# Patient Record
Sex: FEMALE | Race: BLACK OR AFRICAN AMERICAN | NOT HISPANIC OR LATINO | Employment: UNEMPLOYED | URBAN - NONMETROPOLITAN AREA
[De-identification: names, ages, dates, MRNs, and addresses within clinical notes are randomized per-mention and may not be internally consistent; named-entity substitution may affect disease eponyms.]

---

## 2019-03-15 LAB — PAP RECOMMENDATION EXT: NORMAL

## 2021-07-01 DIAGNOSIS — E66.2 OBESITY HYPOVENTILATION SYNDROME: Primary | ICD-10-CM

## 2021-07-01 DIAGNOSIS — R09.02 HYPOXEMIA: Primary | ICD-10-CM

## 2021-10-19 DIAGNOSIS — R09.02 HYPOXIA: Primary | ICD-10-CM

## 2022-01-31 DIAGNOSIS — R09.02 HYPOXIA: Primary | ICD-10-CM

## 2022-02-03 ENCOUNTER — CLINICAL SUPPORT (OUTPATIENT)
Dept: RESPIRATORY THERAPY | Facility: HOSPITAL | Age: 47
End: 2022-02-03
Payer: MEDICAID

## 2022-02-03 DIAGNOSIS — R06.02 SHORTNESS OF BREATH: Primary | ICD-10-CM

## 2022-02-03 LAB
HCO3 UR-SCNC: 28.5 MMOL/L (ref 21–28)
PCO2 BLDA: 45 MMHG (ref 35–48)
PH SMN: 7.41 [PH] (ref 7.35–7.45)
PO2 BLDA: 82 MMHG (ref 83–108)
POC BASE EXCESS: 3.3 MMOL/L (ref -2–3)
POC SATURATED O2: 96 %

## 2022-02-03 PROCEDURE — 94010 BREATHING CAPACITY TEST: CPT

## 2022-02-03 PROCEDURE — 82803 BLOOD GASES ANY COMBINATION: CPT

## 2022-02-03 PROCEDURE — 36600 WITHDRAWAL OF ARTERIAL BLOOD: CPT

## 2022-02-15 NOTE — PROCEDURES
Spirometry   Forced vital capacity 2.66 L 74% predicted  FEV1 2.23 L 75% predicted  FVC ratio 84%  Small airways normal assessment normal spirometry

## 2022-02-16 ENCOUNTER — OFFICE VISIT (OUTPATIENT)
Dept: SLEEP MEDICINE | Facility: CLINIC | Age: 47
End: 2022-02-16
Attending: FAMILY MEDICINE
Payer: MEDICAID

## 2022-02-16 VITALS — BODY MASS INDEX: 45.99 KG/M2 | HEIGHT: 67 IN | OXYGEN SATURATION: 99 % | WEIGHT: 293 LBS

## 2022-02-16 DIAGNOSIS — E66.2 MORBID (SEVERE) OBESITY WITH ALVEOLAR HYPOVENTILATION: ICD-10-CM

## 2022-02-16 DIAGNOSIS — G47.33 OSA ON CPAP: Primary | ICD-10-CM

## 2022-02-16 PROBLEM — G89.29 CHRONIC PAIN: Status: ACTIVE | Noted: 2022-02-16

## 2022-02-16 PROBLEM — I10 HYPERTENSION: Status: ACTIVE | Noted: 2022-02-16

## 2022-02-16 PROCEDURE — 99213 PR OFFICE/OUTPT VISIT, EST, LEVL III, 20-29 MIN: ICD-10-PCS | Mod: S$PBB,,, | Performed by: FAMILY MEDICINE

## 2022-02-16 PROCEDURE — 1159F PR MEDICATION LIST DOCUMENTED IN MEDICAL RECORD: ICD-10-PCS | Mod: CPTII,,, | Performed by: FAMILY MEDICINE

## 2022-02-16 PROCEDURE — 1160F RVW MEDS BY RX/DR IN RCRD: CPT | Mod: CPTII,,, | Performed by: FAMILY MEDICINE

## 2022-02-16 PROCEDURE — 4010F PR ACE/ARB THEARPY RXD/TAKEN: ICD-10-PCS | Mod: CPTII,,, | Performed by: FAMILY MEDICINE

## 2022-02-16 PROCEDURE — 1160F PR REVIEW ALL MEDS BY PRESCRIBER/CLIN PHARMACIST DOCUMENTED: ICD-10-PCS | Mod: CPTII,,, | Performed by: FAMILY MEDICINE

## 2022-02-16 PROCEDURE — 99213 OFFICE O/P EST LOW 20 MIN: CPT | Mod: PBBFAC,PN | Performed by: FAMILY MEDICINE

## 2022-02-16 PROCEDURE — 3008F BODY MASS INDEX DOCD: CPT | Mod: CPTII,,, | Performed by: FAMILY MEDICINE

## 2022-02-16 PROCEDURE — 4010F ACE/ARB THERAPY RXD/TAKEN: CPT | Mod: CPTII,,, | Performed by: FAMILY MEDICINE

## 2022-02-16 PROCEDURE — 3008F PR BODY MASS INDEX (BMI) DOCUMENTED: ICD-10-PCS | Mod: CPTII,,, | Performed by: FAMILY MEDICINE

## 2022-02-16 PROCEDURE — 1159F MED LIST DOCD IN RCRD: CPT | Mod: CPTII,,, | Performed by: FAMILY MEDICINE

## 2022-02-16 PROCEDURE — 99213 OFFICE O/P EST LOW 20 MIN: CPT | Mod: S$PBB,,, | Performed by: FAMILY MEDICINE

## 2022-02-16 RX ORDER — OLMESARTAN MEDOXOMIL 20 MG/1
20 TABLET ORAL DAILY
COMMUNITY

## 2022-02-16 RX ORDER — DULOXETIN HYDROCHLORIDE 60 MG/1
60 CAPSULE, DELAYED RELEASE ORAL 2 TIMES DAILY
COMMUNITY

## 2022-02-16 RX ORDER — GABAPENTIN 600 MG/1
600 TABLET ORAL 3 TIMES DAILY
COMMUNITY

## 2022-02-16 RX ORDER — AMLODIPINE BESYLATE 10 MG/1
10 TABLET ORAL DAILY
COMMUNITY

## 2022-02-16 RX ORDER — ESOMEPRAZOLE MAGNESIUM 40 MG/1
40 CAPSULE, DELAYED RELEASE ORAL
COMMUNITY

## 2022-02-16 RX ORDER — ASPIRIN 81 MG/1
81 TABLET ORAL DAILY
COMMUNITY

## 2022-02-16 NOTE — PROCEDURES
ProceduresPatient presents for results of FVC and ABG.  Patient is tired all the time.  Patient is having bed wetting.  Patient's machine is on recall.  ESS is 17.  Patient gets supplies from [facility] and wears a nasal mask and has a non heated tubing.  Unable to download data card, patient did not bring cord.

## 2022-02-16 NOTE — PROGRESS NOTES
Subjective:       Patient ID: Thomas Hawkins is a 46 y.o. female.    Chief Complaint: Sleep Apnea and Obesity (CPAP management)    Patient follows up for CPAP management and to review her recent pulmonary function test and ABGs.  Patient did not bring her power cord for her CPAP and we were unable to download any data from her card.  Pulmonary function test is reviewed and appears normal and her ABGs reveals a pCO2 of 45.  Patient's last overnight oximetry on CPAP with 2 L of O2 revealed acceptable O2 saturation through the night.  Patient's [place] score is 17 but we are not able to determine her average AHI or compliance at this time.  The patient states she does use her CPAP and oxygen every night.  Patient's initial AHI was 15.9 with a low O2 saturation of 69%.  Patient will bring her CPAP along with her power cord to the sleep clinic next Wednesday in hopes that we will be able to download her card.  The patient is not aware of the [employer] recall and the patient is given a recall sheet.  I reviewed the recall and symptoms that she would need to monitor while using her CPAP.  I have instructed the patient to not stop using her CPAP as her benefit far outweighs the risk.    Review of Systems   Constitutional: Positive for fatigue.        Negative EDS   Respiratory: Negative for apnea.    Psychiatric/Behavioral: Negative for sleep disturbance.         Objective:      Physical Exam  Constitutional:       Appearance: She is obese.   Cardiovascular:      Rate and Rhythm: Normal rate and regular rhythm.      Heart sounds: No murmur heard.      Pulmonary:      Breath sounds: Normal breath sounds.   Skin:     General: Skin is warm and dry.   Neurological:      Mental Status: She is alert and oriented to person, place, and time.         Assessment:       Problem List Items Addressed This Visit        Other    EDER on CPAP - Primary    Morbid (severe) obesity with alveolar hypoventilation          Plan:        1. Continue CPAP @ 13 cm H20 with 2 L of O2.  2. Weight loss management  3. Caution while operating a motor vehicle  4. Prescription for new supplies  5. Follow up sleep clinic in 3 months.

## 2023-03-30 ENCOUNTER — IMMUNIZATION (OUTPATIENT)
Dept: FAMILY MEDICINE | Facility: CLINIC | Age: 48
End: 2023-03-30
Payer: MEDICAID

## 2023-03-30 VITALS
OXYGEN SATURATION: 99 % | HEART RATE: 71 BPM | RESPIRATION RATE: 20 BRPM | SYSTOLIC BLOOD PRESSURE: 131 MMHG | WEIGHT: 293 LBS | BODY MASS INDEX: 45.99 KG/M2 | TEMPERATURE: 99 F | DIASTOLIC BLOOD PRESSURE: 58 MMHG | HEIGHT: 67 IN

## 2023-03-30 DIAGNOSIS — Z23 NEED FOR VACCINATION: Primary | ICD-10-CM

## 2023-03-30 DIAGNOSIS — L02.811 SCALP ABSCESS: Primary | ICD-10-CM

## 2023-03-30 PROCEDURE — 99213 OFFICE O/P EST LOW 20 MIN: CPT | Mod: 25,,, | Performed by: NURSE PRACTITIONER

## 2023-03-30 PROCEDURE — 91313 COVID-19, MRNA, LNP-S, BIVALENT BOOSTER, PF, 50 MCG/0.5 ML: CPT | Mod: ,,, | Performed by: NURSE PRACTITIONER

## 2023-03-30 PROCEDURE — 96372 THER/PROPH/DIAG INJ SC/IM: CPT | Mod: ,,, | Performed by: NURSE PRACTITIONER

## 2023-03-30 PROCEDURE — 0134A COVID-19, MRNA, LNP-S, BIVALENT BOOSTER, PF, 50 MCG/0.5 ML: CPT | Mod: ,,, | Performed by: NURSE PRACTITIONER

## 2023-03-30 PROCEDURE — 91313 COVID-19, MRNA, LNP-S, BIVALENT BOOSTER, PF, 50 MCG/0.5 ML: ICD-10-PCS | Mod: ,,, | Performed by: NURSE PRACTITIONER

## 2023-03-30 PROCEDURE — 99213 PR OFFICE/OUTPT VISIT, EST, LEVL III, 20-29 MIN: ICD-10-PCS | Mod: 25,,, | Performed by: NURSE PRACTITIONER

## 2023-03-30 PROCEDURE — 0134A COVID-19, MRNA, LNP-S, BIVALENT BOOSTER, PF, 50 MCG/0.5 ML: ICD-10-PCS | Mod: ,,, | Performed by: NURSE PRACTITIONER

## 2023-03-30 PROCEDURE — 96372 PR INJECTION,THERAP/PROPH/DIAG2ST, IM OR SUBCUT: ICD-10-PCS | Mod: ,,, | Performed by: NURSE PRACTITIONER

## 2023-03-30 RX ORDER — LINACLOTIDE 145 UG/1
CAPSULE, GELATIN COATED ORAL
COMMUNITY
Start: 2022-10-14

## 2023-03-30 RX ORDER — CEPHALEXIN 500 MG/1
500 CAPSULE ORAL EVERY 6 HOURS
Qty: 30 CAPSULE | Refills: 0 | Status: SHIPPED | OUTPATIENT
Start: 2023-03-30

## 2023-03-30 RX ORDER — GABAPENTIN 800 MG/1
800 TABLET ORAL 3 TIMES DAILY
COMMUNITY
Start: 2023-02-06

## 2023-03-30 RX ORDER — TRAZODONE HYDROCHLORIDE 100 MG/1
TABLET ORAL
COMMUNITY
Start: 2022-10-14

## 2023-03-30 RX ORDER — CEFTRIAXONE 1 G/1
1 INJECTION, POWDER, FOR SOLUTION INTRAMUSCULAR; INTRAVENOUS
Status: COMPLETED | OUTPATIENT
Start: 2023-03-30 | End: 2023-03-30

## 2023-03-30 RX ORDER — OLANZAPINE 5 MG/1
2.5 TABLET ORAL NIGHTLY PRN
COMMUNITY
Start: 2023-03-18

## 2023-03-30 RX ORDER — VORTIOXETINE 5 MG/1
1 TABLET, FILM COATED ORAL
COMMUNITY
Start: 2023-01-26

## 2023-03-30 RX ORDER — METOPROLOL SUCCINATE 25 MG/1
25 TABLET, EXTENDED RELEASE ORAL
COMMUNITY
Start: 2023-02-18

## 2023-03-30 RX ORDER — METHOCARBAMOL 500 MG/1
500 TABLET, FILM COATED ORAL EVERY 8 HOURS PRN
COMMUNITY
Start: 2023-02-07

## 2023-03-30 RX ADMIN — CEFTRIAXONE 1 G: 1 INJECTION, POWDER, FOR SOLUTION INTRAMUSCULAR; INTRAVENOUS at 04:03

## 2023-03-30 NOTE — PROGRESS NOTES
CHUCHO Vargas   [facility]  [address]   [place] [address]     PATIENT NAME: Thomas Hawkins  : 1975  DATE: 3/30/23  MRN: [id number]      Billing Provider: CHUCHO Vargas  Level of Service:   Patient PCP Information       Provider PCP Type    Primary Doctor No General            Reason for Visit / Chief Complaint: sore (Sore on scalp x 4 days) and COVID booster         History of Present Illness / Problem Focused Workflow     Thomas Hawkins is a 47 y.o. female presents to the clinic as a new patient  with  multiple health problems and follows with Stacey Iniguez NP in [place] and follows with Jackie in [place] for depression,.  She came here today due to a sore in her scalp 4 days ago and is very sore. She had a sore on her   right  index finger first and opened it and it cleared up but has been picking at her scalp.    Pt is currently living with her sister, Gilma Hawkins,  and they  are looking after each other.      Review of Systems     Review of Systems   Constitutional:  Negative for fatigue.   HENT:  Negative for nasal congestion and sore throat.    Respiratory:  Negative for cough, chest tightness and shortness of breath.    Cardiovascular:  Negative for chest pain, palpitations and leg swelling.   Gastrointestinal:  Negative for nausea, vomiting and reflux.   Musculoskeletal:  Positive for arthralgias and back pain.   Integumentary:         Right parietal scalp abscess approx 1.5cm that is firm with some crusting noted   Neurological:  Negative for weakness and memory loss.   Psychiatric/Behavioral:  Negative for confusion and sleep disturbance.       Medical / Social / Family History     Past Medical History:   Diagnosis Date    Chronic pain     Hypertension     EDER (obstructive sleep apnea)        Past Surgical History:   Procedure Laterality Date     SECTION      cessarian      cholystectomy      GALLBLADDER SURGERY         Social History  Ms.  reports that she has  never smoked. She has been exposed to tobacco smoke. She has never used smokeless tobacco. She reports current alcohol use of about 1.0 standard drink per week. She reports that she does not use drugs.    Family History  Ms.'s family history is not on file.    Medications and Allergies     Medications  Outpatient Medications Marked as Taking for the 3/30/23 encounter (Immunization) with CHUCHO Vargas   Medication Sig Dispense Refill    amLODIPine (NORVASC) 10 MG tablet Take 10 mg by mouth once daily.      aspirin (ECOTRIN) 81 MG EC tablet Take 81 mg by mouth once daily.      DULoxetine (CYMBALTA) 60 MG capsule Take 60 mg by mouth 2 (two) times daily.      esomeprazole (NEXIUM) 40 MG capsule Take 40 mg by mouth before breakfast.      gabapentin (NEURONTIN) 600 MG tablet Take 600 mg by mouth 3 (three) times daily.      gabapentin (NEURONTIN) 800 MG tablet Take 800 mg by mouth 3 (three) times daily.      LINZESS 145 mcg Cap capsule Take by mouth.      medical supply, miscellaneous (O-2 SUSPENSORY MISC) 2 L/min by Misc.(Non-Drug; Combo Route) route. Patient wears o2 at home all the time on 2lpm.      methocarbamoL (ROBAXIN) 500 MG Tab Take 500 mg by mouth every 8 (eight) hours as needed.      metoprolol succinate (TOPROL-XL) 25 MG 24 hr tablet Take 25 mg by mouth.      OLANZapine (ZYPREXA) 5 MG tablet Take 2.5 mg by mouth nightly as needed.      traZODone (DESYREL) 100 MG tablet Take by mouth.      TRINTELLIX 5 mg Tab Take 1 tablet by mouth.       Current Facility-Administered Medications for the 3/30/23 encounter (Immunization) with CHUCHO Vargas   Medication Dose Route Frequency Provider Last Rate Last Admin    [COMPLETED] cefTRIAXone injection 1 g  1 g Intramuscular 1 time in Clinic/HOD CHUCHO Vargas   1 g at 03/30/23 6338       Allergies  Review of patient's allergies indicates:   Allergen Reactions    Clindamycin Shortness Of Breath and Swelling    Penicillins Itching    Sulfamethoxazole-trimethoprim Itching  "   Abilify [aripiprazole]        Physical Examination     Vitals:    03/30/23 1537   BP: (!) 131/58   Pulse: 71   Resp: 20   Temp: 98.5 °F (36.9 °C)   TempSrc: Oral   SpO2: 99%   Weight: (!) 199.3 kg (439 lb 6.4 oz)   Height: 5' 7" (1.702 m)      Physical Exam  Cardiovascular:      Rate and Rhythm: Normal rate and regular rhythm.      Pulses: Normal pulses.      Heart sounds: Normal heart sounds.   Pulmonary:      Effort: Pulmonary effort is normal.      Breath sounds: Normal breath sounds.   Musculoskeletal:      Comments: Right parietal scalp abscess approx 1.5cm that is firm with some crusting noted   Skin:     General: Skin is warm.        Assessment and Plan (including Health Maintenance)     :    Plan:  advised to apply warm moist compresses to area on scalp every 2 hours,  Rocephin GM1 IM now, keflex 500mg tid #30 Rx.   Take benadryl if any rash or itching develops.  Follow up if not improving in 3 days.         Health Maintenance Due   Topic Date Due    Hepatitis C Screening  Never done    Cervical Cancer Screening  Never done    Lipid Panel  Never done    Pneumococcal Vaccines (Age 0-64) (1 - PCV) Never done    HIV Screening  Never done    Mammogram  Never done    TETANUS VACCINE  06/13/2007    Hemoglobin A1c (Diabetic Prevention Screening)  Never done    Colorectal Cancer Screening  Never done    Influenza Vaccine (1) 09/01/2022       Problem List Items Addressed This Visit    None  Visit Diagnoses       Scalp abscess    -  Primary        .  Scalp abscess  -     cephALEXin (KEFLEX) 500 MG capsule; Take 1 capsule (500 mg total) by mouth every 6 (six) hours.  Dispense: 30 capsule; Refill: 0    Other orders  -     cefTRIAXone injection 1 g       The patient has no Health Maintenance topics of status Not Due    Procedures     Future Appointments   Date Time Provider Department Center   4/21/2023  1:45 PM Donovan Mendoza, DO [facility]        No follow-ups on file.     Signature:  Eli Luna, " FNP    Date of encounter: 3/30/23

## 2023-04-18 ENCOUNTER — PATIENT OUTREACH (OUTPATIENT)
Dept: ADMINISTRATIVE | Facility: HOSPITAL | Age: 48
End: 2023-04-18

## 2023-06-14 ENCOUNTER — OFFICE VISIT (OUTPATIENT)
Dept: SLEEP MEDICINE | Facility: CLINIC | Age: 48
End: 2023-06-14
Payer: MEDICAID

## 2023-06-14 VITALS
OXYGEN SATURATION: 99 % | SYSTOLIC BLOOD PRESSURE: 139 MMHG | WEIGHT: 293 LBS | HEART RATE: 68 BPM | HEIGHT: 67 IN | DIASTOLIC BLOOD PRESSURE: 87 MMHG | BODY MASS INDEX: 45.99 KG/M2

## 2023-06-14 DIAGNOSIS — G47.33 OSA ON CPAP: Primary | ICD-10-CM

## 2023-06-14 DIAGNOSIS — E66.2 MORBID (SEVERE) OBESITY WITH ALVEOLAR HYPOVENTILATION: ICD-10-CM

## 2023-06-14 DIAGNOSIS — I10 ESSENTIAL HYPERTENSION: ICD-10-CM

## 2023-06-14 PROCEDURE — 99213 PR OFFICE/OUTPT VISIT, EST, LEVL III, 20-29 MIN: ICD-10-PCS | Mod: S$PBB,,, | Performed by: FAMILY MEDICINE

## 2023-06-14 PROCEDURE — 3008F PR BODY MASS INDEX (BMI) DOCUMENTED: ICD-10-PCS | Mod: CPTII,,, | Performed by: FAMILY MEDICINE

## 2023-06-14 PROCEDURE — 3079F PR MOST RECENT DIASTOLIC BLOOD PRESSURE 80-89 MM HG: ICD-10-PCS | Mod: CPTII,,, | Performed by: FAMILY MEDICINE

## 2023-06-14 PROCEDURE — 1159F MED LIST DOCD IN RCRD: CPT | Mod: CPTII,,, | Performed by: FAMILY MEDICINE

## 2023-06-14 PROCEDURE — 3008F BODY MASS INDEX DOCD: CPT | Mod: CPTII,,, | Performed by: FAMILY MEDICINE

## 2023-06-14 PROCEDURE — 3079F DIAST BP 80-89 MM HG: CPT | Mod: CPTII,,, | Performed by: FAMILY MEDICINE

## 2023-06-14 PROCEDURE — 3075F PR MOST RECENT SYSTOLIC BLOOD PRESS GE 130-139MM HG: ICD-10-PCS | Mod: CPTII,,, | Performed by: FAMILY MEDICINE

## 2023-06-14 PROCEDURE — 99213 OFFICE O/P EST LOW 20 MIN: CPT | Mod: S$PBB,,, | Performed by: FAMILY MEDICINE

## 2023-06-14 PROCEDURE — 1159F PR MEDICATION LIST DOCUMENTED IN MEDICAL RECORD: ICD-10-PCS | Mod: CPTII,,, | Performed by: FAMILY MEDICINE

## 2023-06-14 PROCEDURE — 99214 OFFICE O/P EST MOD 30 MIN: CPT | Mod: PBBFAC,PN | Performed by: FAMILY MEDICINE

## 2023-06-14 PROCEDURE — 3075F SYST BP GE 130 - 139MM HG: CPT | Mod: CPTII,,, | Performed by: FAMILY MEDICINE

## 2023-06-14 NOTE — PROGRESS NOTES
Patient presents to clinic for CPAP F/U. ESS is 20. Patient gets supplies from [facility] in [place]. Patient wears a nasal mask with non-heated tubing. Patient has received her new machine from the recall. Patient states that the water chamber door doesn't want to close at times. Patient is supposed to wear o2 at night, but cannot use the concentrator due to her home situation.

## 2023-06-14 NOTE — PROGRESS NOTES
Subjective     Patient ID: Thomas Hawkins is a 47 y.o. female.    Chief Complaint: No chief complaint on file.    Patient follows up in sleep clinic for CPAP management having no problems with her CPAP or mask but is not able to use her oxygen because of electrical issues in her home.  The patient is 100% on her compliance with an average AHI of 1.4 at a pressure of 13 cm H2O.  The patient has obese hypoventilation and requires 2 L of O2 with her CPAP.  I discussed her diagnosis and her nocturnal hypoxia issues and the risk of not treating her hypoxia.  Patient's initial AHI was 15.9 with a low O2 saturation of 69%.  The patient is using and benefitting from CPAP.  The patient states that she will get this electrical issue corrected and start using her O2 with her CPAP.    Review of Systems   Constitutional:  Positive for fatigue.        Negative EDS   Respiratory:  Negative for apnea.    Psychiatric/Behavioral:  Negative for sleep disturbance.         Objective     Physical Exam  Constitutional:       Appearance: She is morbidly obese.   Cardiovascular:      Rate and Rhythm: Normal rate and regular rhythm.      Heart sounds: No murmur heard.  Pulmonary:      Breath sounds: Normal breath sounds.   Skin:     General: Skin is warm and dry.   Neurological:      Mental Status: She is alert and oriented to person, place, and time.          Assessment and Plan     1. EDER on CPAP    2. Morbid (severe) obesity with alveolar hypoventilation    3. Essential hypertension        Continue CPAP @ 13 cm H20 with 2 L of O2.  Caution while operating a motor vehicle  Weight loss management  Prescription for new supplies  Follow up sleep clinic in 1 year.           Follow up in about 1 year (around 6/14/2024).

## 2024-01-05 ENCOUNTER — TELEPHONE (OUTPATIENT)
Dept: SLEEP MEDICINE | Facility: CLINIC | Age: 49
End: 2024-01-05
Payer: MEDICAID

## 2024-03-11 ENCOUNTER — OFFICE VISIT (OUTPATIENT)
Dept: OBSTETRICS AND GYNECOLOGY | Facility: CLINIC | Age: 49
End: 2024-03-11
Payer: MEDICAID

## 2024-03-11 VITALS — TEMPERATURE: 98 F | HEIGHT: 67 IN | WEIGHT: 293 LBS | BODY MASS INDEX: 45.99 KG/M2

## 2024-03-11 DIAGNOSIS — R82.81 PYURIA: ICD-10-CM

## 2024-03-11 DIAGNOSIS — D25.9 UTERINE LEIOMYOMA, UNSPECIFIED LOCATION: ICD-10-CM

## 2024-03-11 DIAGNOSIS — N28.9 IMPAIRED RENAL FUNCTION: ICD-10-CM

## 2024-03-11 DIAGNOSIS — N32.81 OAB (OVERACTIVE BLADDER): Primary | ICD-10-CM

## 2024-03-11 DIAGNOSIS — R73.03 PREDIABETES: ICD-10-CM

## 2024-03-11 DIAGNOSIS — R35.1 NOCTURIA MORE THAN TWICE PER NIGHT: ICD-10-CM

## 2024-03-11 DIAGNOSIS — N76.0 BACTERIAL VAGINOSIS: ICD-10-CM

## 2024-03-11 DIAGNOSIS — R93.89 THICKENED ENDOMETRIUM: ICD-10-CM

## 2024-03-11 DIAGNOSIS — E66.2 MORBID (SEVERE) OBESITY WITH ALVEOLAR HYPOVENTILATION: ICD-10-CM

## 2024-03-11 DIAGNOSIS — B96.89 BACTERIAL VAGINOSIS: ICD-10-CM

## 2024-03-11 DIAGNOSIS — G47.33 OSA ON CPAP: ICD-10-CM

## 2024-03-11 DIAGNOSIS — E55.9 VITAMIN D DEFICIENCY: ICD-10-CM

## 2024-03-11 DIAGNOSIS — N89.8 VAGINAL DISCHARGE: ICD-10-CM

## 2024-03-11 DIAGNOSIS — G89.4 CHRONIC PAIN SYNDROME: ICD-10-CM

## 2024-03-11 DIAGNOSIS — A59.01 VAGINITIS DUE TO TRICHOMONAS: ICD-10-CM

## 2024-03-11 DIAGNOSIS — B97.7 HPV IN FEMALE: ICD-10-CM

## 2024-03-11 DIAGNOSIS — M54.50 CHRONIC LOW BACK PAIN WITHOUT SCIATICA, UNSPECIFIED BACK PAIN LATERALITY: ICD-10-CM

## 2024-03-11 DIAGNOSIS — N76.0 VAGINITIS AND VULVOVAGINITIS: ICD-10-CM

## 2024-03-11 DIAGNOSIS — I10 ESSENTIAL HYPERTENSION: ICD-10-CM

## 2024-03-11 DIAGNOSIS — G89.29 CHRONIC LOW BACK PAIN WITHOUT SCIATICA, UNSPECIFIED BACK PAIN LATERALITY: ICD-10-CM

## 2024-03-11 DIAGNOSIS — N85.2 BULKY OR ENLARGED UTERUS: ICD-10-CM

## 2024-03-11 LAB
BACTERIA #/AREA URNS HPF: ABNORMAL /HPF
BILIRUB UR QL STRIP: NEGATIVE
CANDIDA SPECIES: NEGATIVE
CLARITY UR: ABNORMAL
COLOR UR: YELLOW
CTP QC/QA: YES
FECAL OCCULT BLOOD, POC: NEGATIVE
GARDNERELLA: POSITIVE
GLUCOSE UR STRIP-MCNC: NORMAL MG/DL
KETONES UR STRIP-SCNC: NEGATIVE MG/DL
LEUKOCYTE ESTERASE UR QL STRIP: ABNORMAL
MUCOUS, UA: ABNORMAL /LPF
NITRITE UR QL STRIP: NEGATIVE
PH UR STRIP: 5.5 PH UNITS
PROT UR QL STRIP: 30
RBC # UR STRIP: ABNORMAL /UL
RBC #/AREA URNS HPF: 7 /HPF
SP GR UR STRIP: 1.03
SQUAMOUS #/AREA URNS LPF: ABNORMAL /HPF
TRICHOMONAS: POSITIVE
UROBILINOGEN UR STRIP-ACNC: 2 MG/DL
WBC #/AREA URNS HPF: 30 /HPF

## 2024-03-11 PROCEDURE — 81001 URINALYSIS AUTO W/SCOPE: CPT | Mod: ,,, | Performed by: CLINICAL MEDICAL LABORATORY

## 2024-03-11 PROCEDURE — 3008F BODY MASS INDEX DOCD: CPT | Mod: CPTII,,, | Performed by: OBSTETRICS & GYNECOLOGY

## 2024-03-11 PROCEDURE — 99459 PELVIC EXAMINATION: CPT | Mod: PBBFAC | Performed by: OBSTETRICS & GYNECOLOGY

## 2024-03-11 PROCEDURE — 87510 GARDNER VAG DNA DIR PROBE: CPT | Mod: ,,, | Performed by: CLINICAL MEDICAL LABORATORY

## 2024-03-11 PROCEDURE — 82270 OCCULT BLOOD FECES: CPT | Mod: PBBFAC | Performed by: OBSTETRICS & GYNECOLOGY

## 2024-03-11 PROCEDURE — 1160F RVW MEDS BY RX/DR IN RCRD: CPT | Mod: CPTII,,, | Performed by: OBSTETRICS & GYNECOLOGY

## 2024-03-11 PROCEDURE — 87660 TRICHOMONAS VAGIN DIR PROBE: CPT | Mod: ,,, | Performed by: CLINICAL MEDICAL LABORATORY

## 2024-03-11 PROCEDURE — 99205 OFFICE O/P NEW HI 60 MIN: CPT | Mod: S$PBB,,, | Performed by: OBSTETRICS & GYNECOLOGY

## 2024-03-11 PROCEDURE — 87086 URINE CULTURE/COLONY COUNT: CPT | Mod: ,,, | Performed by: CLINICAL MEDICAL LABORATORY

## 2024-03-11 PROCEDURE — 87624 HPV HI-RISK TYP POOLED RSLT: CPT | Mod: ,,, | Performed by: CLINICAL MEDICAL LABORATORY

## 2024-03-11 PROCEDURE — 99999PBSHW POCT OCCULT BLOOD STOOL: Mod: PBBFAC,,,

## 2024-03-11 PROCEDURE — 87480 CANDIDA DNA DIR PROBE: CPT | Mod: ,,, | Performed by: CLINICAL MEDICAL LABORATORY

## 2024-03-11 PROCEDURE — 99214 OFFICE O/P EST MOD 30 MIN: CPT | Mod: PBBFAC | Performed by: OBSTETRICS & GYNECOLOGY

## 2024-03-11 PROCEDURE — 87591 N.GONORRHOEAE DNA AMP PROB: CPT | Mod: ,,, | Performed by: CLINICAL MEDICAL LABORATORY

## 2024-03-11 PROCEDURE — 99459 PELVIC EXAMINATION: CPT | Mod: S$PBB,,, | Performed by: OBSTETRICS & GYNECOLOGY

## 2024-03-11 PROCEDURE — 87491 CHLMYD TRACH DNA AMP PROBE: CPT | Mod: ,,, | Performed by: CLINICAL MEDICAL LABORATORY

## 2024-03-11 PROCEDURE — 1159F MED LIST DOCD IN RCRD: CPT | Mod: CPTII,,, | Performed by: OBSTETRICS & GYNECOLOGY

## 2024-03-11 PROCEDURE — 88142 CYTOPATH C/V THIN LAYER: CPT | Mod: TC,GCY | Performed by: OBSTETRICS & GYNECOLOGY

## 2024-03-11 PROCEDURE — 99999PBSHW PR PBB SHADOW TECHNICAL ONLY FILED TO HB: Mod: PBBFAC,,,

## 2024-03-11 RX ORDER — SOLIFENACIN SUCCINATE 10 MG/1
10 TABLET, FILM COATED ORAL DAILY
Qty: 30 TABLET | Refills: 11 | Status: SHIPPED | OUTPATIENT
Start: 2024-03-11 | End: 2025-03-11

## 2024-03-11 RX ORDER — MELOXICAM 15 MG/1
TABLET ORAL
COMMUNITY
Start: 2024-03-01

## 2024-03-11 NOTE — PATIENT INSTRUCTIONS
Dr. Iraj Estrada thanks you for this office visit at [facility].    Diagnosis for this visit:   Problem List Items Addressed This Visit          Neuro    Chronic pain       Cardiac/Vascular    Essential hypertension       Endocrine    Morbid (severe) obesity with alveolar hypoventilation       Other    EDER on CPAP     Other Visit Diagnoses       OAB (overactive bladder)    -  Primary    Nocturia more than twice per night        Chronic low back pain without sciatica, unspecified back pain laterality        Prediabetes        Vitamin D deficiency        Bulky or enlarged uterus        Uterine leiomyoma, unspecified location        Thickened endometrium                 The Plan:  Repeat pelvic ultrasound; screening labs; vitamin-D check; hemoglobin A1c; thin prep Pap; hemoccult screen was negative today                Doctor Natalie will initiate VESIcare daily for bladder control - Gemtesa and oxybutynin is not authorized and a PA required             Schedule for pelvic ultrasound to evaluate leiomyomata as well as endometrial stripe             Flagyl for the vaginal discharge               Follow-up in 1 month the parity                  Please practice best food and exercise habits for your age.    Dr. Iraj Estrada recommends avoidance of smoking and illicit medications or habits.    Please call  or schedule for any change in your health.     Please keep the next scheduled appointment or call for any need to change the appointment.     Dr. Iraj Estrada recommends yearly exams for good health maintenance.      Thank you    Dr. Iraj Estrada  03/11/2024 2:51 PM

## 2024-03-11 NOTE — PROGRESS NOTES
Thomas Hawkins female  for   Chief Complaint   Patient presents with    bladder control    Fibroids    vaginal odor    Dyspareunia      OB History    No obstetric history on file.          HPI:  48-year-old  2, para 3 Afro-American female who approximately 3 decades ago underwent a primary  section for twins presents today for complaint of 2-3 years of urgency and frequency of urination with nocturia greater than 4 times per night.  She has as a consequence of the nocturia, significant insomnia.     Patient denies urinary stress incontinence.    She denies any vaginal bleeding.  She does complain of intermittent odorous discharge.  Last menses was .At the time of her  section she did undergo a tubal sterilization.  Surgery was performed at [facility].    Patient denies any urinary tract infections.  Patient denies dyspareunia.  She complains of hot flashes.    Patient has had elsewhere a pelvic ultrasound on on 2022.  Ultrasound reveals possibly a intramural as well as submucous leiomyoma with an accordingly uterine size 11 cm x 6.4 x 7.1.  Endometrium at that time was 8.6 mm but poorly visualized due to fibroids. Small numerous fibroids were described. 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  There was also nabothian cysts.    Narrative    REFERRED BY:  Char Ireland    DICTATED BY:  Dr. Paul Cartwright    PROCEDURE:  US PELVIS NON OB COMPLETE  Order Date: 2022    REASON FOR EXAM:  please do transvaginal sono, No menses since late  , Hx of HEAVY menses, Hx of Fibroids, Hx of Novasure ablation  , Intramural and submucous leiomyoma of uterus, Intramural and  submucous leiomyoma of uterus    FINDINGS:  Today's study is compared to the previous study of  2018.  Transabdominal and transvaginal imaging was  performed.  Study is limited due to the patient's morbid obesity.  The uterus measures around 11.0 x 6.4 x 7.1 cm.  Endometrium is  "very  poorly visualized, but measures approximately 8.6 mm in thickness.  Neither the left nor right ovary could be identified and could be  obscured by overlying bowel gas or due to the patient's body habitus.  Uterus is anteflexed with multiple small uterine fibroids scattered  throughout.  Largest is in the fundal region measuring up to about 4  cm.  Multiple nabothian cysts are again noted, some of which have  internal echoes.  Largest measures up to about 1.7 cm.  There also  solid-appearing masses within the cervix, measuring up to 1.3 cm     This study was at [facility] in [place]    Past Medical History:   Diagnosis Date    Chronic pain     Hypertension     EDER (obstructive sleep apnea)       Past Surgical History:   Procedure Laterality Date     SECTION      cessarian      cholystectomy      GALLBLADDER SURGERY        Review of patient's allergies indicates:   Allergen Reactions    Clindamycin Shortness Of Breath and Swelling    Penicillins Itching    Sulfamethoxazole-trimethoprim Itching    Abilify [aripiprazole]     Olanzapine     Oxybutynin chloride     Sulfamethoxazole     Trimethoprim         Review of Systems:Pertinent items are noted in HPI.     Physical exam:This gyn related exam was chaperoned by a female medical assistant.      Temp 98.3 °F (36.8 °C)   Ht 5' 7" (1.702 m)   Wt (!) 210.1 kg (463 lb 3.2 oz)   LMP  (LMP Unknown) Comment: Pt states she hasn't had a menstrual cycle since 2016.  BMI 72.55 kg/m²      General Appearance: alert, smiling, massively obese Afro-American female in no obvious distress  - BMS > 72!  Her partner was in attendance during the interview and exam.  HEENT: Normal exam    Lymphatic: no palpable lymphadenopathy, no hepatosplenomegaly    Chest:           Breasts:No dimpling, nipple retraction or discharge. No masses or nodes., Normal to inspection, Normal breast tissue bilaterally, and massive breast hypertrophy bilaterally was noted on " exam.           Lungs:clear to auscultation bilaterally           Heart: regular rate and rhythm, S1, S2 normal, no murmur, click, rub or gallop    Abdomen: soft, non-tender, without masses or organomegaly, protuberant, normal bowel sounds, without guarding, without rebound, and Pfannenstiel incision from primary  for 20 with no evidence of ventral incisional hernia; large abdominal panniculus and a very massive large abdominal fat padding; no abdominal bruit is noted on exam.    Pelvic:                    Vulva: normal appearing vulva with no masses, tenderness or lesions, significant fat adiposity of the mons pubis                    Cervix: nulliparous os, very high and hard to visualize in the upper vagina with infolding fat padding around the vaginal walls; slight bloody discharge; no vaginal odor noted on exam today; affirm study was obtained today.                     Uterus:  Uterus not palpable and  position of the uterus unable to be evaluated due her size with extreme adiposity; nontender                    Annexa(e):  Nontender adnexa but impossible feel size and shape of the ovaries due to the extreme adiposity                   Rectal: normal rectal, no masses, guaiac negative stool obtained.     Extremity: normal, extremities warm, no clubbing, no cyanosis, no edema, non-tender; low back pain on exam with good obvious midline alignment and no CVA tenderness bilaterally    Skin: normal exam; draping fat folds on the abdomen and on the back    Psych and neurologic exam: WNL                Assessment:   Problem List Items Addressed This Visit          Neuro    Chronic pain    Relevant Orders    ThinPrep Pap Test    POCT occult blood stool (Completed)    CBC Auto Differential (Completed)    Comprehensive Metabolic Panel (Completed)    Estradiol (Completed)    Follicle Stimulating Hormone (Completed)    Luteinizing Hormone (Completed)    Sedimentation Rate (Completed)    Hemoglobin A1C (Completed)     Vitamin D (Completed)    Urinalysis, Reflex to Urine Culture (Completed)    Thyroid Panel (Completed)    Urinalysis, Microscopic (Completed)    Urine culture       Cardiac/Vascular    Essential hypertension    Relevant Orders    ThinPrep Pap Test    POCT occult blood stool (Completed)    CBC Auto Differential (Completed)    Comprehensive Metabolic Panel (Completed)    Estradiol (Completed)    Follicle Stimulating Hormone (Completed)    Luteinizing Hormone (Completed)    Sedimentation Rate (Completed)    Hemoglobin A1C (Completed)    Vitamin D (Completed)    Urinalysis, Reflex to Urine Culture (Completed)    Thyroid Panel (Completed)    Urinalysis, Microscopic (Completed)    Urine culture       Endocrine    Morbid (severe) obesity with alveolar hypoventilation    Relevant Medications    solifenacin (VESICARE) 10 MG tablet    Other Relevant Orders    ThinPrep Pap Test    POCT occult blood stool (Completed)    CBC Auto Differential (Completed)    Comprehensive Metabolic Panel (Completed)    Estradiol (Completed)    Follicle Stimulating Hormone (Completed)    Luteinizing Hormone (Completed)    Sedimentation Rate (Completed)    Hemoglobin A1C (Completed)    Vitamin D (Completed)    Urinalysis, Reflex to Urine Culture (Completed)    Thyroid Panel (Completed)    Urinalysis, Microscopic (Completed)    Urine culture       Other    EDER on CPAP    Relevant Orders    ThinPrep Pap Test    POCT occult blood stool (Completed)    CBC Auto Differential (Completed)    Comprehensive Metabolic Panel (Completed)    Estradiol (Completed)    Follicle Stimulating Hormone (Completed)    Luteinizing Hormone (Completed)    Sedimentation Rate (Completed)    Hemoglobin A1C (Completed)    Vitamin D (Completed)    Urinalysis, Reflex to Urine Culture (Completed)    Thyroid Panel (Completed)    Urinalysis, Microscopic (Completed)    Urine culture     Other Visit Diagnoses       OAB (overactive bladder)    -  Primary    Relevant Medications     solifenacin (VESICARE) 10 MG tablet    Other Relevant Orders    ThinPrep Pap Test    POCT occult blood stool (Completed)    CBC Auto Differential (Completed)    Comprehensive Metabolic Panel (Completed)    Estradiol (Completed)    Follicle Stimulating Hormone (Completed)    Luteinizing Hormone (Completed)    Sedimentation Rate (Completed)    Hemoglobin A1C (Completed)    Vitamin D (Completed)    Urinalysis, Reflex to Urine Culture (Completed)    Thyroid Panel (Completed)    Urinalysis, Microscopic (Completed)    Urine culture    Nocturia more than twice per night        Relevant Medications    solifenacin (VESICARE) 10 MG tablet    Other Relevant Orders    ThinPrep Pap Test    POCT occult blood stool (Completed)    CBC Auto Differential (Completed)    Comprehensive Metabolic Panel (Completed)    Estradiol (Completed)    Follicle Stimulating Hormone (Completed)    Luteinizing Hormone (Completed)    Sedimentation Rate (Completed)    Hemoglobin A1C (Completed)    Vitamin D (Completed)    Urinalysis, Reflex to Urine Culture (Completed)    Thyroid Panel (Completed)    Urinalysis, Microscopic (Completed)    Urine culture    Chronic low back pain without sciatica, unspecified back pain laterality        Relevant Orders    ThinPrep Pap Test    POCT occult blood stool (Completed)    CBC Auto Differential (Completed)    Comprehensive Metabolic Panel (Completed)    Estradiol (Completed)    Follicle Stimulating Hormone (Completed)    Luteinizing Hormone (Completed)    Sedimentation Rate (Completed)    Hemoglobin A1C (Completed)    Vitamin D (Completed)    Urinalysis, Reflex to Urine Culture (Completed)    Thyroid Panel (Completed)    Urinalysis, Microscopic (Completed)    Urine culture    Prediabetes        Relevant Orders    ThinPrep Pap Test    POCT occult blood stool (Completed)    CBC Auto Differential (Completed)    Comprehensive Metabolic Panel (Completed)    Estradiol (Completed)    Follicle Stimulating Hormone  (Completed)    Luteinizing Hormone (Completed)    Sedimentation Rate (Completed)    Hemoglobin A1C (Completed)    Vitamin D (Completed)    Urinalysis, Reflex to Urine Culture (Completed)    Thyroid Panel (Completed)    Urinalysis, Microscopic (Completed)    Urine culture    Vitamin D deficiency        Relevant Orders    ThinPrep Pap Test    POCT occult blood stool (Completed)    CBC Auto Differential (Completed)    Comprehensive Metabolic Panel (Completed)    Estradiol (Completed)    Follicle Stimulating Hormone (Completed)    Luteinizing Hormone (Completed)    Sedimentation Rate (Completed)    Hemoglobin A1C (Completed)    Vitamin D (Completed)    Urinalysis, Reflex to Urine Culture (Completed)    Thyroid Panel (Completed)    Urinalysis, Microscopic (Completed)    Urine culture    Bulky or enlarged uterus        Relevant Orders    ThinPrep Pap Test    POCT occult blood stool (Completed)    CBC Auto Differential (Completed)    Comprehensive Metabolic Panel (Completed)    Estradiol (Completed)    Follicle Stimulating Hormone (Completed)    Luteinizing Hormone (Completed)    Sedimentation Rate (Completed)    Hemoglobin A1C (Completed)    Vitamin D (Completed)    Urinalysis, Reflex to Urine Culture (Completed)    Thyroid Panel (Completed)    US Pelvis Complete Non OB    Urinalysis, Microscopic (Completed)    Urine culture    Uterine leiomyoma, unspecified location        Relevant Orders    ThinPrep Pap Test    POCT occult blood stool (Completed)    CBC Auto Differential (Completed)    Comprehensive Metabolic Panel (Completed)    Estradiol (Completed)    Follicle Stimulating Hormone (Completed)    Luteinizing Hormone (Completed)    Sedimentation Rate (Completed)    Hemoglobin A1C (Completed)    Vitamin D (Completed)    Urinalysis, Reflex to Urine Culture (Completed)    Thyroid Panel (Completed)    US Pelvis Complete Non OB    Urinalysis, Microscopic (Completed)    Urine culture    Thickened endometrium        Relevant  Orders    ThinPrep Pap Test    POCT occult blood stool (Completed)    CBC Auto Differential (Completed)    Comprehensive Metabolic Panel (Completed)    Estradiol (Completed)    Follicle Stimulating Hormone (Completed)    Luteinizing Hormone (Completed)    Sedimentation Rate (Completed)    Hemoglobin A1C (Completed)    Vitamin D (Completed)    Urinalysis, Reflex to Urine Culture (Completed)    Thyroid Panel (Completed)    US Pelvis Complete Non OB    Urinalysis, Microscopic (Completed)    Urine culture    Vaginal discharge        Relevant Orders    Bacterial Vaginosis (Completed)    Vaginitis and vulvovaginitis        Pyuria        Vaginitis due to Trichomonas        confirmed by Affirm study    Bacterial vaginosis        Impaired renal function                 Plan:  Repeat pelvic ultrasound; screening labs; vitamin-D check; hemoglobin A1c; thin prep Pap; hemoccult screen was negative today               Doctor Natalie will initiate VESIcare daily for bladder control - Gemtesa, Detrol and oxybutyninare not authorized and a PA are required             Schedule for pelvic ultrasound to evaluate leiomyomata as well as endometrial stripe             Flagyl for the vaginal discharge              Follow-up in 1 month the parity              Addendum on 03/11/2024 at 11:58 p.m.:  Patient was found have both bacterial vaginosis as well as Trichomonas.  Patient was on Flagyl therapy.           Patient has evidence of impaired renal function and surprisingly no hyperglycemia.            Addendum on 3/18/2924:  Patient has a negative Pap cytology but her HPV type other is positive.  She does need colposcopy.                    Addendum on 04/04/2024:  Chart was reviewed and evidently the Flagyl was never prescribed although recommended.  Therefore Dr. Estrada will prescribe today Flagyl to be sent to her pharmacy.  In addition she needs be scheduled for colposcopy since she is positive HPV with negative Pap cytology.

## 2024-03-12 ENCOUNTER — TELEPHONE (OUTPATIENT)
Dept: OBSTETRICS AND GYNECOLOGY | Facility: CLINIC | Age: 49
End: 2024-03-12
Payer: MEDICAID

## 2024-03-12 NOTE — TELEPHONE ENCOUNTER
----- Message from Iraj Estrada MD sent at 3/11/2024 11:59 PM CDT -----  Regarding: Bacterial infection  Advised the patient that she has both Trichomonas as well as bacterial vaginosis and this is the cause of her odor.  She needs to take the Flagyl medicine that I prescribed.

## 2024-03-13 LAB
GH SERPL-MCNC: NORMAL NG/ML
INSULIN SERPL-ACNC: NORMAL U[IU]/ML
LAB AP CLINICAL INFORMATION: NORMAL
LAB AP GYN INTERPRETATION: NEGATIVE
LAB AP PAP DISCLAIMER COMMENTS: NORMAL
RENIN PLAS-CCNC: NORMAL NG/ML/H
UA COMPLETE W REFLEX CULTURE PNL UR: NORMAL

## 2024-03-14 ENCOUNTER — TELEPHONE (OUTPATIENT)
Dept: OBSTETRICS AND GYNECOLOGY | Facility: CLINIC | Age: 49
End: 2024-03-14
Payer: MEDICAID

## 2024-03-14 NOTE — TELEPHONE ENCOUNTER
VM not set up. Please advise pt that PA was done on 3/11 and was denied due to it exceeding the amount of medications allowed per month. Pt will have to pay out of pocket.        ----- Message from Lynn Harmon sent at 3/14/2024  3:08 PM CDT -----  Regarding: PA  Patient call stating they need a PA for the prescription that was sent to [facility] in [place], call back number for the patient is [phone] or [phone]

## 2024-03-16 LAB
CHLAMYDIA BY PCR: NEGATIVE
HPV 16: NEGATIVE
HPV 18: NEGATIVE
HPV OTHER: POSITIVE
N. GONORRHOEAE (GC) BY PCR: NEGATIVE

## 2024-03-19 ENCOUNTER — TELEPHONE (OUTPATIENT)
Dept: OBSTETRICS AND GYNECOLOGY | Facility: CLINIC | Age: 49
End: 2024-03-19
Payer: MEDICAID

## 2024-03-19 NOTE — TELEPHONE ENCOUNTER
VM not set up. Attempting to call to see if she can come earlier at her next appt and we can do the procedure then.      ----- Message from Iraj Estrada MD sent at 3/18/2024  5:03 PM CDT -----  Regarding: HPV  Patient has a negative cytology on Pap smear but her HPV has been returned showing type not 16 or 18 but other.  Due to her age and the HPV positive finding, she does need colposcopy for surveillance.

## 2024-04-03 ENCOUNTER — TELEPHONE (OUTPATIENT)
Dept: OBSTETRICS AND GYNECOLOGY | Facility: CLINIC | Age: 49
End: 2024-04-03
Payer: MEDICAID

## 2024-04-03 NOTE — TELEPHONE ENCOUNTER
VM not set up. A message was sent to Dr. Estrada to see if he would resend the rx.     ----- Message from Nathaly Hagan sent at 4/2/2024  1:41 PM CDT -----  pt calling checking on prescription that was supposed to be called into [facility] about 2 wks ago - pt has called pharmacy and states they do not have a prescription on pt - call back # [phone]

## 2024-04-04 ENCOUNTER — TELEPHONE (OUTPATIENT)
Dept: OBSTETRICS AND GYNECOLOGY | Facility: CLINIC | Age: 49
End: 2024-04-04
Payer: MEDICAID

## 2024-04-04 RX ORDER — METRONIDAZOLE 500 MG/1
500 TABLET ORAL 3 TIMES DAILY
Qty: 42 TABLET | Refills: 0 | Status: SHIPPED | OUTPATIENT
Start: 2024-04-04 | End: 2024-05-01 | Stop reason: SDUPTHER

## 2024-04-04 NOTE — TELEPHONE ENCOUNTER
Medication was called into pharmacy; our end shows the original was sent in March with a denied PA. Pt does not have vm set up. Pt may have to pay out of pocket for this medication.        ----- Message from Ritu Egan sent at 4/4/2024  1:25 PM CDT -----  Pt was supposed have Vesicare at [facility] in [place] on 03/11. Pharmacy doesn't see it from last month. Pt needs please.

## 2024-04-04 NOTE — TELEPHONE ENCOUNTER
VM not set up       ----- Message from Iraj Estrada MD sent at 4/4/2024 12:21 PM CDT -----  Regarding: Flagyl prescription  I did review of the records intermittently I recommended it and evidently never sent a prescription.  This was dated 03/11/2024.  Today I did send a prescription to her pharmacy and cartilage for Flagyl.    Her Pap smears returned with negative cytology but positive HPV.  She does need colposcopy.

## 2024-04-05 ENCOUNTER — TELEPHONE (OUTPATIENT)
Dept: OBSTETRICS AND GYNECOLOGY | Facility: CLINIC | Age: 49
End: 2024-04-05
Payer: MEDICAID

## 2024-04-08 ENCOUNTER — TELEPHONE (OUTPATIENT)
Dept: OBSTETRICS AND GYNECOLOGY | Facility: CLINIC | Age: 49
End: 2024-04-08
Payer: MEDICAID

## 2024-04-08 NOTE — TELEPHONE ENCOUNTER
----- Message from Oralia Whyte LPN sent at 4/8/2024 12:42 PM CDT -----  Are you able to reschedule this please? She doesn't answer when I call. Thanks!  ----- Message -----  From: Ritu Egan  Sent: 4/8/2024  12:28 PM CDT  To: Natalie TAYLOR. Staff    Pt had appt for today with an u/s but appt was canceled.  She wants to res both please. Call [phone].

## 2024-04-08 NOTE — TELEPHONE ENCOUNTER
----- Message from Oralia Whyte LPN sent at 4/8/2024  2:46 PM CDT -----  Regarding: FW: RETURN CALL  Calling back about her appt; this is the same pt that has no vm set up.  ----- Message -----  From: Lynn Harmon  Sent: 4/8/2024   2:42 PM CDT  To: Natalie MCDONNELL Staff  Subject: RETURN CALL                                      PATIENT RETURN YOUR CALL, CALL BACK NUMBER [phone]

## 2024-04-17 ENCOUNTER — TELEPHONE (OUTPATIENT)
Dept: OBSTETRICS AND GYNECOLOGY | Facility: CLINIC | Age: 49
End: 2024-04-17
Payer: MEDICAID

## 2024-04-17 NOTE — TELEPHONE ENCOUNTER
----- Message from Carla Mancini sent at 4/17/2024  3:24 PM CDT -----  #PATIENT NEED FOR YOU TO CALL IT ABOUT HER APPOINTMENT FOR HER ULTRASOUND APPOINTMENT  CALL BACK NUMBER [phone]

## 2024-04-17 NOTE — TELEPHONE ENCOUNTER
Returned pt call to reschedule appt. Pt aware of appt for 05-01-24 at 1300. No questions or concerns at this time. Plan of care ongoing.

## 2024-05-01 ENCOUNTER — OFFICE VISIT (OUTPATIENT)
Dept: OBSTETRICS AND GYNECOLOGY | Facility: CLINIC | Age: 49
End: 2024-05-01
Attending: OBSTETRICS & GYNECOLOGY
Payer: MEDICAID

## 2024-05-01 ENCOUNTER — HOSPITAL ENCOUNTER (OUTPATIENT)
Dept: RADIOLOGY | Facility: HOSPITAL | Age: 49
Discharge: HOME | End: 2024-05-01
Attending: OBSTETRICS & GYNECOLOGY
Payer: MEDICAID

## 2024-05-01 VITALS
DIASTOLIC BLOOD PRESSURE: 84 MMHG | BODY MASS INDEX: 45.99 KG/M2 | OXYGEN SATURATION: 98 % | SYSTOLIC BLOOD PRESSURE: 121 MMHG | HEART RATE: 93 BPM | WEIGHT: 293 LBS | RESPIRATION RATE: 19 BRPM | HEIGHT: 67 IN

## 2024-05-01 DIAGNOSIS — I10 ESSENTIAL HYPERTENSION: ICD-10-CM

## 2024-05-01 DIAGNOSIS — N85.2 BULKY OR ENLARGED UTERUS: ICD-10-CM

## 2024-05-01 DIAGNOSIS — E66.2 MORBID (SEVERE) OBESITY WITH ALVEOLAR HYPOVENTILATION: ICD-10-CM

## 2024-05-01 DIAGNOSIS — B96.89 BACTERIAL VAGINOSIS: ICD-10-CM

## 2024-05-01 DIAGNOSIS — A59.01 VAGINITIS DUE TO TRICHOMONAS: ICD-10-CM

## 2024-05-01 DIAGNOSIS — N76.0 BACTERIAL VAGINOSIS: ICD-10-CM

## 2024-05-01 DIAGNOSIS — Z78.0 MENOPAUSE: ICD-10-CM

## 2024-05-01 DIAGNOSIS — D25.9 UTERINE LEIOMYOMA, UNSPECIFIED LOCATION: ICD-10-CM

## 2024-05-01 DIAGNOSIS — R93.89 THICKENED ENDOMETRIUM: Primary | ICD-10-CM

## 2024-05-01 DIAGNOSIS — R93.89 THICKENED ENDOMETRIUM: ICD-10-CM

## 2024-05-01 PROCEDURE — 1160F RVW MEDS BY RX/DR IN RCRD: CPT | Mod: CPTII,,, | Performed by: OBSTETRICS & GYNECOLOGY

## 2024-05-01 PROCEDURE — 99215 OFFICE O/P EST HI 40 MIN: CPT | Mod: PBBFAC,25 | Performed by: OBSTETRICS & GYNECOLOGY

## 2024-05-01 PROCEDURE — 3044F HG A1C LEVEL LT 7.0%: CPT | Mod: CPTII,,, | Performed by: OBSTETRICS & GYNECOLOGY

## 2024-05-01 PROCEDURE — 3008F BODY MASS INDEX DOCD: CPT | Mod: CPTII,,, | Performed by: OBSTETRICS & GYNECOLOGY

## 2024-05-01 PROCEDURE — 76856 US EXAM PELVIC COMPLETE: CPT | Mod: 26,,, | Performed by: RADIOLOGY

## 2024-05-01 PROCEDURE — 3079F DIAST BP 80-89 MM HG: CPT | Mod: CPTII,,, | Performed by: OBSTETRICS & GYNECOLOGY

## 2024-05-01 PROCEDURE — 76856 US EXAM PELVIC COMPLETE: CPT | Mod: TC

## 2024-05-01 PROCEDURE — 3074F SYST BP LT 130 MM HG: CPT | Mod: CPTII,,, | Performed by: OBSTETRICS & GYNECOLOGY

## 2024-05-01 PROCEDURE — 1159F MED LIST DOCD IN RCRD: CPT | Mod: CPTII,,, | Performed by: OBSTETRICS & GYNECOLOGY

## 2024-05-01 PROCEDURE — 99214 OFFICE O/P EST MOD 30 MIN: CPT | Mod: S$PBB,,, | Performed by: OBSTETRICS & GYNECOLOGY

## 2024-05-01 RX ORDER — METRONIDAZOLE 500 MG/1
500 TABLET ORAL 3 TIMES DAILY
Qty: 42 TABLET | Refills: 0 | Status: SHIPPED | OUTPATIENT
Start: 2024-05-01 | End: 2024-05-01 | Stop reason: SDUPTHER

## 2024-05-01 RX ORDER — METRONIDAZOLE 500 MG/1
500 TABLET ORAL 3 TIMES DAILY
Qty: 84 TABLET | Refills: 0 | Status: SHIPPED | OUTPATIENT
Start: 2024-05-01 | End: 2024-05-15

## 2024-05-01 NOTE — PATIENT INSTRUCTIONS
Dr. Iraj Estrada thanks you for this office visit at [facility].    Diagnosis for this visit:   Problem List Items Addressed This Visit          Cardiac/Vascular    Essential hypertension       Endocrine    Morbid (severe) obesity with alveolar hypoventilation     Other Visit Diagnoses       Thickened endometrium    -  Primary    8.3 mm by today's ultrasound    Bulky or enlarged uterus        Menopause        Bacterial vaginosis        Vaginitis due to Trichomonas                 The Plan:  Recommendation is referral to tertiary center for endometrial sampling -with her age and elevated BMI is 72 she is at risk for endometrial hyperplasia or neoplasia.  Due to BMI is 72 is main indicator for referral to [facility].            Patient states she never did receive the Flagyl for Trichomonas and bacterial vaginosis.  After talking to the patient, the medication was sent to [facility] rather than [place], [place] which is her hometown pharmacy [facility].      Please practice best food and exercise habits for your age.    Dr. Iraj Estrada recommends avoidance of smoking and illicit medications or habits.    Please call  or schedule for any change in your health.     Please keep the next scheduled appointment or call for any need to change the appointment.     Dr. Iraj Estrada recommends yearly exams for good health maintenance.      Thank you    Dr. Iraj Estrada  05/01/2024 2:22 PM

## 2024-05-01 NOTE — PROGRESS NOTES
Thomas Hawkins female  for   Chief Complaint   Patient presents with    pelvic US     Patient is here for a pelvic US follow up, patient got her US done prior to appt           PHI:  Patient presents here for follow-up.  Patient did undergo today on 2024 a pelvic ultrasound that revealed some growth of uterus now at 11.8 x 4.7 x 6.5 cm with multiple uterine fibroids.  Endometrial stripe is thickened at 8.3 mm with some fluid in the endometrial canal.  Patient has nabothian cysts as well.    Patient currently is 48 years of age and a  2, para 3 with this  section for her twin delivery-her last delivery.    Patient has been amenorrheic for 6 years.    She is still complaining of a foul smelling vaginal discharge.  Affirm study did indicate bacterial vaginosis as well as Trichomonas.      Current radiology report of pelvic ultrasound today:  CLINICAL HISTORY:  Hypertrophy of uterus     TECHNIQUE:  Grayscale and color Doppler imaging performed     COMPARISON:  None.     FINDINGS:  The uterine length is 11.8 cm. Endometrial thickness is 8.3 mm.  Multiple leiomyoma present, the largest measures 4 4.7 x 4.5 x 3.9 cm.  Multiple cyst on the cervix.  Solid areas also visible in the cervix largest measures 1.4 x 1.1 x 1.3 cm.  No abnormal echogenicity is seen.     Ovaries are obscured.  No focal abnormality seen. No free fluid is identified     Impression:     Multiple uterine leiomyoma and nabothian cyst.  Solid areas also present on the cervix.  No other abnormality demonstrated.        Electronically signed by:Campos Miller  Date:                                            2024  Time:                                           14:13  Past Medical History:   Diagnosis Date    Chronic pain     Hypertension     EDER (obstructive sleep apnea)       Past Surgical History:   Procedure Laterality Date     SECTION      cessarian      cholystectomy      GALLBLADDER SURGERY        Review of  "patient's allergies indicates:   Allergen Reactions    Clindamycin Shortness Of Breath and Swelling    Penicillins Itching    Sulfamethoxazole-trimethoprim Itching    Abilify [aripiprazole]     Olanzapine     Oxybutynin chloride     Sulfamethoxazole     Trimethoprim         ROS:Pertinent items are noted in HPI.    Physical exam:    /84 (BP Location: Left arm, Patient Position: Sitting)   Pulse 93   Resp 19   Ht 5' 7" (1.702 m)   Wt (!) 210 kg (463 lb)   SpO2 98%   BMI 72.52 kg/m²      General Appearance: healthy, alert, smiling, BMI of 72 - morbid obesity      Chest:           Lungs: clear to auscultation bilaterally           Heart: regular rate and rhythm, S1, S2 normal, no murmur, click, rub or gallop    Abdomen:not performed    Pelvic: not performed     Extremity: normal    Skin: normal exam        Assessment:   Problem List Items Addressed This Visit          Cardiac/Vascular    Essential hypertension       Endocrine    Morbid (severe) obesity with alveolar hypoventilation     Other Visit Diagnoses       Thickened endometrium    -  Primary    8.3 mm by today's ultrasound    Bulky or enlarged uterus        Menopause        Bacterial vaginosis        Vaginitis due to Trichomonas                 Plan:  Recommendation: referral to tertiary center for endometrial sampling -with her age and elevated BMI is 72 she is at risk for endometrial hyperplasia or neoplasia.  Due to BMI of 72.  Her extremely elevated BMI is the main indicator for referral to [facility].  Anesthesia department at [facility] in [facility] in the past has been very uncomfortable with BMI is above 65.  Doctor Natalie also agrees.            Patient states that she never did receive the Flagyl for Trichomonas and bacterial vaginosis.  After talking to the patient today, the medication was sent to [facility] pharmacy rather than to the [facility] pharmacy at [place], [place] which is her hometown pharmacy [facility].            A " new prescription was dispensed electronically to the [facility] pharmacy near her home in [place], [place]; the amount was for both her and her sexual partner-.

## 2024-05-21 DIAGNOSIS — E66.2 MORBID (SEVERE) OBESITY WITH ALVEOLAR HYPOVENTILATION: ICD-10-CM

## 2024-05-21 DIAGNOSIS — N85.2 BULKY OR ENLARGED UTERUS: ICD-10-CM

## 2024-05-21 DIAGNOSIS — R93.89 THICKENED ENDOMETRIUM: ICD-10-CM
